# Patient Record
(demographics unavailable — no encounter records)

---

## 2025-01-29 NOTE — HEALTH RISK ASSESSMENT
[No] : In the past 12 months have you used drugs other than those required for medical reasons? No [0] : 2) Feeling down, depressed, or hopeless: Not at all (0) [PHQ-2 Negative - No further assessment needed] : PHQ-2 Negative - No further assessment needed [Never] : Never [de-identified] : walks routinely, plays some sports a few times per week [de-identified] : healthy [[id number]] : 0 [PapSmearComments] : not sexually active previously

## 2025-01-29 NOTE — ASSESSMENT
[FreeTextEntry1] : # HM f/u AV labs Not sex active, no need to see GYN yet Refused flu vaccine  # Heavy menses, blood in stool f/u CBC and iron studies  # Constipation f/u TFTs  f/u in 1 year or PRN

## 2025-01-29 NOTE — HEALTH RISK ASSESSMENT
[No] : In the past 12 months have you used drugs other than those required for medical reasons? No [0] : 2) Feeling down, depressed, or hopeless: Not at all (0) [PHQ-2 Negative - No further assessment needed] : PHQ-2 Negative - No further assessment needed [Never] : Never [de-identified] : walks routinely, plays some sports a few times per week [de-identified] : healthy [[id number]] : 0 [PapSmearComments] : not sexually active previously

## 2025-01-29 NOTE — REVIEW OF SYSTEMS
[Constipation] : constipation [Negative] : Respiratory [FreeTextEntry7] : blood on toilet paper after hard stool sometimes

## 2025-01-29 NOTE — HISTORY OF PRESENT ILLNESS
[FreeTextEntry1] : est care, CPE [de-identified] : Pt comes in to est care and get CPE.  Hx constipation: improved recently. Does have blood when she wipes after episodes of constipation. Not copious amounts. No unintentional weight loss, change in appetite, change in bowel habits. No strong fam hx colon ca.  Depression: used to go to therapy, didn't feel like she needed it so she stopped. Never on medication for this. Father has history of depression as well.

## 2025-01-29 NOTE — PHYSICAL EXAM
[Normal TMs] : both tympanic membranes were normal [Supple] : supple [Normal] : normal rate, regular rhythm, normal S1 and S2 and no murmur heard [No Edema] : there was no peripheral edema [No Extremity Clubbing/Cyanosis] : no extremity clubbing/cyanosis [Soft] : abdomen soft [Non Tender] : non-tender [Non-distended] : non-distended [No Masses] : no abdominal mass palpated [Normal Supraclavicular Nodes] : no supraclavicular lymphadenopathy [Normal Anterior Cervical Nodes] : no anterior cervical lymphadenopathy [Normal Gait] : normal gait [Speech Grossly Normal] : speech grossly normal [Normal Affect] : the affect was normal [Alert and Oriented x3] : oriented to person, place, and time [Normal Mood] : the mood was normal [Normal Insight/Judgement] : insight and judgment were intact

## 2025-01-29 NOTE — HISTORY OF PRESENT ILLNESS
[FreeTextEntry1] : est care, CPE [de-identified] : Pt comes in to est care and get CPE.  Hx constipation: improved recently. Does have blood when she wipes after episodes of constipation. Not copious amounts. No unintentional weight loss, change in appetite, change in bowel habits. No strong fam hx colon ca.  Depression: used to go to therapy, didn't feel like she needed it so she stopped. Never on medication for this. Father has history of depression as well.

## 2025-06-12 NOTE — HISTORY OF PRESENT ILLNESS
[FreeTextEntry1] : 21  presents today for consultation for oligomenorrhea  cycles 30-55 days  Labs and sono ordered  To start Minoo after labs
